# Patient Record
Sex: MALE | Race: WHITE | NOT HISPANIC OR LATINO | Employment: UNEMPLOYED | URBAN - METROPOLITAN AREA
[De-identification: names, ages, dates, MRNs, and addresses within clinical notes are randomized per-mention and may not be internally consistent; named-entity substitution may affect disease eponyms.]

---

## 2021-05-30 ENCOUNTER — RECORDS - HEALTHEAST (OUTPATIENT)
Dept: ADMINISTRATIVE | Facility: CLINIC | Age: 36
End: 2021-05-30

## 2021-06-28 ENCOUNTER — HOSPITAL ENCOUNTER (EMERGENCY)
Dept: EMERGENCY MEDICINE | Facility: HOSPITAL | Age: 36
Discharge: HOME | End: 2021-06-28
Attending: EMERGENCY MEDICINE | Admitting: STUDENT IN AN ORGANIZED HEALTH CARE EDUCATION/TRAINING PROGRAM
Payer: COMMERCIAL

## 2021-06-28 DIAGNOSIS — S61.011A THUMB LACERATION, RIGHT, INITIAL ENCOUNTER: ICD-10-CM

## 2021-07-06 VITALS — WEIGHT: 200 LBS

## 2021-07-07 NOTE — ED NOTES
Thumb sutured by provider and covered with non stick pad and aluminum thumb splint and taped in place.

## 2021-07-07 NOTE — ED PROVIDER NOTES
Emergency Department Encounter   NAME: Jesus Christine ; AGE: 35 y.o. male ; YOB: 1985 ; MRN: [id number] ; EVALUATION DATE & TIME: 6/28/2021 11:16 AM ; PCP: Provider, No Primary Care   ED PROVIDER: Abena Kim PA-C    Chief Complaint   Patient presents with     Finger Injury       Medical Decision Making & Final Diagnosis     1. Thumb laceration, right, initial encounter       Jesus Christine is a 35 y.o. male with no relevant PMH, who presents to the ED for evaluation of finger injury.  Patient presents to the emergency department for evaluation of a laceration to his dominant hand right thumb.  He sustained this just prior to arrival, when thumb got stuck in the sprocket when changing is bike wheel.  Here in the ED, he has a 2 and half centimeter laceration to the dorsal thumb overlying the interphalangeal joint that does extend into the subcutaneous tissue.  Wound appears deep though is not wide, and difficult to evaluate the base.  He is not having active bleeding at rest, however when he flexes at his interphalangeal joint, he did have a small brisk bleed.  Distal CMS is intact.  He has full active and passive range of motion at his interphalangeal and MCP joint with intact strength and no discomfort.  My suspicion for an extensor tendon injury is quite low.  Digital block was performed and a tourniquet was placed, wound was thoroughly explored and probed under direct visualization without active bleeding.  No evidence of tendon rupture or retained foreign body.  Wound was thoroughly irrigated using normal saline, and a 0.5 cc 1% lidocaine with epinephrine was administered at site of bleeding.  After tourniquet removal, slight ooze from the wound that resolved with pressure though no return of brisk bleeding.  Distal CMS remained intact.  Wound was then closed using 4 interrupted sutures which he tolerated well.  Distal CMS unchanged following closure.  Tetanus was updated recently.  We  discussed wound care, splint wear due to wound being under tension, monitoring for signs of infection at home, prophylactic antibiotics, scarring, and concerning signs and symptoms to return to the ED.  He verbalized understanding is comfortable with the plan.  Discharged home with plan for follow-up in 10 to 14 days for suture removal.      ED Course   11:37 AM I met and introduced myself to the patient. I gathered initial history and performed my physical exam. We discussed plan for initial workup.   1:03 PM Performed digital block.  1:19 PM Performed laceration repair.  We discussed discharge, follow-up, and reasons to return to the ED.    MEDICATIONS GIVEN IN THE EMERGENCY:   Medications - No data to display   NEW PRESCRIPTIONS STARTED AT TODAY'S ER VISIT:  Discharge Medication List as of 6/28/2021  1:43 PM      START taking these medications    Details   cephalexin (KEFLEX) 500 MG capsule Take 1 capsule (500 mg total) by mouth 2 (two) times a day for 7 days., Starting Mon 6/28/2021, Until Mon 7/5/2021, Print            =================================================================   History   Patient information was obtained from: patient    Use of Intrepreter: N/A   Jesus Christine is a 35 y.o. male with no relevant PMH, who presents to the ED for evaluation of finger injury.     Around 0900 this morning (~2.5 hours ago), patient was changing the wheel on his bike when his right thumb got caught in the sprocket. He endorses a laceration in the middle of his thumb with moderate bleeding. Patient is right handed. He is not on any daily medication or blood thinners. Patient denies numbness or any other additional symptoms at this time.     ______________________________________________________________________  Relevant past medical history reviewed with patient, unless otherwise stated in HPI, history not pertinent to this visit.    Relevant past surgical history reviewed with patient, unless otherwise stated  in HPI, history not pertinent to this visit.    Relevant family history reviewed with patient, unless otherwise stated in HPI, history not pertinent to this visit.    Social History     Tobacco Use     Smoking status: Not on file   Substance Use Topics     Alcohol use: Not on file     Drug use: Not on file       REVIEW OF SYSTEMS:    Review of Systems   Constitutional: Negative for chills and fever.   HENT: Negative for sore throat.    Respiratory: Negative for cough and shortness of breath.    Cardiovascular: Negative for chest pain.   Gastrointestinal: Negative for abdominal pain.   Musculoskeletal: Negative for back pain.   Skin: Positive for wound (laceration to right thumb).   Neurological: Negative for weakness, light-headedness and numbness.   All other systems reviewed and are negative.        Physical Exam   /83 (Patient Position: Sitting)   Pulse 70   Temp 98  F (36.7  C)   Resp 16   Wt 200 lb (90.7 kg)   SpO2 98%     Physical Exam   Constitutional: He appears well-developed and well-nourished. No distress.   Musculoskeletal:      Comments: 2.5 cm subcutaneous laceration to the dorsum of the right hand thumb overlying interphalangeal joint.  No active bleeding at rest, however with flexion at the interphalangeal joint, small brisk bleed occurred.  Patient is able to fully extend and flex without difficulty or pain at his MCP and interphalangeal joint.  After digital block, tourniquet applied, and wound was thoroughly explored through range of motion without visible tendon rupture.  Distal cap refill is less than 2 seconds and distal thumb is warm and well perfused without pallor or coolness.  Sensory exam intact.   Skin: Skin is warm and dry. He is not diaphoretic.   Nursing note and vitals reviewed.      Imaging (Reviewed and Interpreted):   Xr Finger Right 2 Or More Vws    Result Date: 6/28/2021  EXAM: XR FINGER RIGHT 2 OR MORE VWS LOCATION: [facility] DATE/TIME:  6/28/2021 12:43 PM INDICATION: laceration COMPARISON: None.     No fracture or foreign body is seen       PROCEDURES:   PROCEDURE: Digital Block   INDICATIONS: Thumb laceration    PROCEDURE PROVIDER: Abena Kim PA-C   SITE: right thumb (1st digit)   MEDICATION: 1% Lidocaine   NOTE: The skin overlying the site for injection was prepped with chlorhexidine.  Needle was inserted in a standard three point injection pattern.  Each time the area was aspirated and there was no return of blood.  I then injected the medication at the base of the digit.  A total of 4 ml of the medication was injected.  The patient had a good response to the procedure   COMPLICATIONS: None     PROCEDURE:  Laceration Repair   INDICATIONS: Laceration   PROCEDURE PROVIDER: Abena Kim PA-C   SITE: Right thumb (1st digit)   TYPE/SIZE: A subcutaneous moderately contaminated 2.5 cm laceration.   FUNCTIONAL ASSESSMENT: Distally/surrounding area sensation, circulation, motor and tendon function are intact.   MEDICATION: See digital block note.    PREPARATION: irrigation with Normal Saline   DEBRIDEMENT: wound explored, no foreign body found   CLOSURE:  Wound was closed in one layer. Skin closed with  4-0 Prolene using  interrupted sutures  Total number of sutures/staples placed: 4        I, Melissa Yuan, am serving as a scribe to document services personally performed by Abena Kim PA-C, based on my observation and the provider's statements to me. I, Abena Kim PA-C attest that Melissa Yuan is acting in a scribe capacity, has observed my performance of the services and has documented them in accordance with my direction.     Abena Kim PA-C   Emergency Medicine   [facility] EMERGENCY DEPARTMENT  [address]  [facility] [address]  Dept: [phone]  Loc: [phone]      Abena Kim PA-C  06/28/21 2306

## 2021-07-07 NOTE — ED TRIAGE NOTES
Patient arrives by private car for evaluation of right thumb injury.  Patient reports he was working on his bike and finger was caught in chain.  Last tetanus 08/2020

## 2021-07-30 PROCEDURE — 99283 EMERGENCY DEPT VISIT LOW MDM: CPT

## 2021-07-30 ASSESSMENT — MIFFLIN-ST. JEOR: SCORE: 1871.59

## 2021-07-31 ENCOUNTER — HOSPITAL ENCOUNTER (EMERGENCY)
Facility: CLINIC | Age: 36
Discharge: HOME | End: 2021-07-31
Attending: STUDENT IN AN ORGANIZED HEALTH CARE EDUCATION/TRAINING PROGRAM | Admitting: STUDENT IN AN ORGANIZED HEALTH CARE EDUCATION/TRAINING PROGRAM
Payer: COMMERCIAL

## 2021-07-31 VITALS
WEIGHT: 197 LBS | HEIGHT: 72 IN | OXYGEN SATURATION: 100 % | TEMPERATURE: 99.8 F | BODY MASS INDEX: 26.68 KG/M2 | RESPIRATION RATE: 16 BRPM | SYSTOLIC BLOOD PRESSURE: 148 MMHG | HEART RATE: 94 BPM | DIASTOLIC BLOOD PRESSURE: 97 MMHG

## 2021-07-31 DIAGNOSIS — L03.211 FACIAL CELLULITIS: ICD-10-CM

## 2021-07-31 PROCEDURE — 250N000013 HC RX MED GY IP 250 OP 250 PS 637: Performed by: STUDENT IN AN ORGANIZED HEALTH CARE EDUCATION/TRAINING PROGRAM

## 2021-07-31 RX ORDER — CEPHALEXIN 500 MG/1
500 CAPSULE ORAL ONCE
Status: DISCONTINUED | OUTPATIENT
Start: 2021-07-31 | End: 2021-07-31

## 2021-07-31 RX ORDER — SULFAMETHOXAZOLE/TRIMETHOPRIM 800-160 MG
1 TABLET ORAL ONCE
Status: COMPLETED | OUTPATIENT
Start: 2021-07-31 | End: 2021-07-31

## 2021-07-31 RX ORDER — SULFAMETHOXAZOLE/TRIMETHOPRIM 800-160 MG
1 TABLET ORAL 2 TIMES DAILY
Qty: 20 TABLET | Refills: 0 | Status: SHIPPED | OUTPATIENT
Start: 2021-07-31 | End: 2021-08-10

## 2021-07-31 RX ADMIN — SULFAMETHOXAZOLE AND TRIMETHOPRIM 1 TABLET: 800; 160 TABLET ORAL at 02:32

## 2021-07-31 RX ADMIN — AMOXICILLIN AND CLAVULANATE POTASSIUM 1 TABLET: 875; 125 TABLET, FILM COATED ORAL at 02:27

## 2021-07-31 NOTE — ED TRIAGE NOTES
Pt arrives with left cheek and lip swelling that he noticed this AM. Has tried benadryl with really no reduction. No trouble breathing or pain.

## 2021-07-31 NOTE — ED PROVIDER NOTES
Emergency Department Encounter         FINAL IMPRESSION:  Facial cellulitis        ED COURSE AND MEDICAL DECISION MAKING                 Patient is a 35-year-old smoker with no other chronic medical problems here with left-sided facial swelling and purulence for the past 24 to 36 hours.  Patient noticed swelling yesterday morning.  Noticed mild dental pain that has been struggling with the past few months.  No fevers, chills, nausea or vomiting.  Came here for increasing left lower facial swelling.    On arrival he looks well.  Nontoxic.  Is obvious facial swelling involving the left side of his face just inferior to the zygomatic arch.  Has a small area of purulence on the corner of his left mouth.  His upper and lower lip on the left side subtly are swollen.  No significant trismus.  No pain along the anterior external neck.  No mastoiditis.  No significant swelling or redness that radiates up past the zygomatic arch or causes any palpebral edema.    On palpation of patient's face, is able to pinch the cheek and did not palpate any abscess.  Tapping of the upper and lower teeth on the left side did not elicit any significant pain.  He does have more pain in the lower jaw angle compared to the upper.  Patient endorses that he ate 2 bags of croutons within the last 2 days he noticed a small cut on the left side inner half of his mouth.  This could be an area where he was exposed to bacteria and could be the source of infection.  Also has been struggling with left lower dental pain which also could be the source.  Nonetheless, he has no signs of Crescencio's angina.  His posterior oropharynx is normal.  He has no drooling or voice changes.  He has no streaking up his face past the zygomatic arch or to the anterior auricular region.  He has no significant lymphadenopathy.  He has full range of motion of his neck.    Bedside ultrasound reveals no significant fluid collection around patient's face.  I do not appreciate  "any periapical abscess that would be amendable to drainage.  I think at this point I am comfortable with patient going home with Augmentin.  I do not see any signs or symptoms suggesting any extension of the obvious facial cellulitis into the posterior aspect of his face neck or head.  He has no palpebral edema suggesting early preseptal or post septal cellulitis.  Plan for Augmentin here and close outpatient follow-up.  Patient seems very reliable and we gave strict return precautions.                      At the conclusion of the encounter I discussed the results of all the tests and the disposition. The questions were answered. The patient or family acknowledged understanding and was agreeable with the care plan.                  MEDICATIONS GIVEN IN THE EMERGENCY DEPARTMENT:  Medications   amoxicillin-clavulanate (AUGMENTIN) 875-125 MG per tablet 1 tablet (has no administration in time range)       NEW PRESCRIPTIONS STARTED AT TODAY'S ED VISIT:  New Prescriptions    No medications on file       HPI     Patient is a 35-year-old here with 24 hours of facial cellulitis and redness.  Has been struggling dental pain for the past few months.  Has not seen a dentist.  No fevers, chills, nausea vomiting.    No trouble swallowing.  Trouble breathing.  No tongue swelling.  No throat pain.  States he recently had an ear infection states that his whole ear \"turned crusty.\"  No vomiting.        REVIEW OF SYSTEMS:  Review of Systems   Constitutional: Negative for fever, malaise  HEENT: Facial pain.  Respiratory: Negative for shortness of breath, cough, congestion  Cardiovascular: Negative for chest pain, leg edema  Gastrointestinal: Negative for abdominal distention, abdominal pain, constipation, vomiting, nausea, diarrhea  Genitourinary: Negative for dysuria and hematuria.   Integument: Negative for rash, skin breakdown  Neurological: Negative for paresthesias, weakness, headache.  Musculoskeletal: Negative for joint pain, " joint swelling      All other systems reviewed and are negative.          MEDICAL HISTORY     No past medical history on file.    No past surgical history on file.    Social History     Tobacco Use     Smoking status: Not on file   Substance Use Topics     Alcohol use: Not on file     Drug use: Not on file       No current outpatient medications on file.          PHYSICAL EXAM     BP (!) 148/97   Pulse 94   Temp 99.8  F (37.7  C) (Oral)   Resp 16   Ht 1.829 m (6')   Wt 89.4 kg (197 lb)   SpO2 100%   BMI 26.72 kg/m        PHYSICAL EXAM:     General: Patient appears well, nontoxic, comfortable  HEENT: Moist mucous membranes, no tongue swelling.  No head trauma.  No midline neck pain.  Redness and swelling noted to the lower half of left side of face just inferior to the zygomatic arch and anterior to the left auricular region.  No swelling under the submandibular region.  Mild puffiness of left upper and left lower lips.  Small amount of purulence on the corner of left lip.  No palpable abscess.  No Crescencio's angina.  No uvular deviation, no tonsillar asymmetry, no stridor, no drooling, no exudates, no redness  Cardiovascular: Normal rate, normal rhythm, no extremity edema.  No appreciable murmur.  Respiratory: No signs of respiratory distress, lungs are clear to auscultation bilaterally with no wheezes rhonchi or rales.  Abdominal: Soft, nontender, nondistended, no palpable masses, no guarding, no rebound  Musculoskeletal: Full range of motion of joints, no deformities appreciated.  Neurological: Alert and oriented, grossly neurologically intact.  Psychological: Normal affect and mood.  Integument: No rashes appreciated          RESULTS       Labs Ordered and Resulted from Time of ED Arrival Up to the Time of Departure from the ED - No data to display    No orders to display                     PROCEDURES:  Procedures:  Procedures       Hector Rogers, DO  Emergency Medicine  [facility]  CAMPUS EMERGENCY ROOM       Ohl, Hector Anguiano,   07/31/21 0766